# Patient Record
Sex: FEMALE
[De-identification: names, ages, dates, MRNs, and addresses within clinical notes are randomized per-mention and may not be internally consistent; named-entity substitution may affect disease eponyms.]

---

## 2022-04-27 PROBLEM — Z00.00 ENCOUNTER FOR PREVENTIVE HEALTH EXAMINATION: Status: ACTIVE | Noted: 2022-04-27

## 2022-04-29 ENCOUNTER — APPOINTMENT (OUTPATIENT)
Dept: ANTEPARTUM | Facility: CLINIC | Age: 37
End: 2022-04-29
Payer: COMMERCIAL

## 2022-04-29 PROCEDURE — 76811 OB US DETAILED SNGL FETUS: CPT

## 2022-05-04 ENCOUNTER — NON-APPOINTMENT (OUTPATIENT)
Age: 37
End: 2022-05-04

## 2022-05-11 ENCOUNTER — APPOINTMENT (OUTPATIENT)
Dept: MATERNAL FETAL MEDICINE | Facility: CLINIC | Age: 37
End: 2022-05-11

## 2022-05-12 ENCOUNTER — ASOB RESULT (OUTPATIENT)
Age: 37
End: 2022-05-12

## 2022-05-12 ENCOUNTER — APPOINTMENT (OUTPATIENT)
Dept: ANTEPARTUM | Facility: CLINIC | Age: 37
End: 2022-05-12
Payer: COMMERCIAL

## 2022-05-12 PROCEDURE — 76816 OB US FOLLOW-UP PER FETUS: CPT

## 2022-06-02 ENCOUNTER — OUTPATIENT (OUTPATIENT)
Dept: OUTPATIENT SERVICES | Age: 37
LOS: 1 days | Discharge: HOME | End: 2022-06-02

## 2022-06-06 ENCOUNTER — APPOINTMENT (OUTPATIENT)
Dept: PEDIATRIC CARDIOLOGY | Facility: CLINIC | Age: 37
End: 2022-06-06

## 2022-06-08 ENCOUNTER — ASOB RESULT (OUTPATIENT)
Age: 37
End: 2022-06-08

## 2022-06-08 ENCOUNTER — APPOINTMENT (OUTPATIENT)
Dept: MATERNAL FETAL MEDICINE | Facility: CLINIC | Age: 37
End: 2022-06-08
Payer: COMMERCIAL

## 2022-06-08 DIAGNOSIS — O24.414 GESTATIONAL DIABETES MELLITUS IN PREGNANCY, INSULIN CONTROLLED: ICD-10-CM

## 2022-06-08 PROCEDURE — G0108 DIAB MANAGE TRN  PER INDIV: CPT | Mod: 95

## 2022-06-08 RX ORDER — PEN NEEDLE, DIABETIC 29 G X1/2"
32G X 4 MM NEEDLE, DISPOSABLE MISCELLANEOUS
Qty: 2 | Refills: 3 | Status: ACTIVE | COMMUNITY
Start: 2022-06-08 | End: 1900-01-01

## 2022-06-08 RX ORDER — ISOPROPYL ALCOHOL 0.7 ML/ML
SWAB TOPICAL
Qty: 2 | Refills: 3 | Status: ACTIVE | COMMUNITY
Start: 2022-06-08 | End: 1900-01-01

## 2022-06-09 PROBLEM — Z00.00 ENCOUNTER FOR PREVENTIVE HEALTH EXAMINATION: Status: ACTIVE | Noted: 2022-06-09

## 2022-06-13 ENCOUNTER — APPOINTMENT (OUTPATIENT)
Dept: PEDIATRIC CARDIOLOGY | Facility: CLINIC | Age: 37
End: 2022-06-13

## 2022-06-13 PROCEDURE — 76820 UMBILICAL ARTERY ECHO: CPT

## 2022-06-13 PROCEDURE — 76827 ECHO EXAM OF FETAL HEART: CPT

## 2022-06-13 PROCEDURE — 93325 DOPPLER ECHO COLOR FLOW MAPG: CPT | Mod: 59

## 2022-06-13 PROCEDURE — 76821 MIDDLE CEREBRAL ARTERY ECHO: CPT

## 2022-06-13 PROCEDURE — 99202 OFFICE O/P NEW SF 15 MIN: CPT | Mod: 25

## 2022-06-13 PROCEDURE — 76825 ECHO EXAM OF FETAL HEART: CPT

## 2022-06-20 RX ORDER — INSULIN DETEMIR 100 [IU]/ML
100 INJECTION, SOLUTION SUBCUTANEOUS
Qty: 2 | Refills: 3 | Status: ACTIVE | COMMUNITY
Start: 2022-06-08 | End: 1900-01-01

## 2022-06-29 ENCOUNTER — APPOINTMENT (OUTPATIENT)
Dept: MATERNAL FETAL MEDICINE | Facility: CLINIC | Age: 37
End: 2022-06-29

## 2022-07-20 ENCOUNTER — NON-APPOINTMENT (OUTPATIENT)
Age: 37
End: 2022-07-20

## 2022-07-27 ENCOUNTER — NON-APPOINTMENT (OUTPATIENT)
Age: 37
End: 2022-07-27

## 2022-08-03 ENCOUNTER — ASOB RESULT (OUTPATIENT)
Age: 37
End: 2022-08-03

## 2022-08-03 ENCOUNTER — APPOINTMENT (OUTPATIENT)
Dept: MATERNAL FETAL MEDICINE | Facility: CLINIC | Age: 37
End: 2022-08-03

## 2022-08-03 PROCEDURE — G0108 DIAB MANAGE TRN  PER INDIV: CPT | Mod: 95

## 2022-08-10 ENCOUNTER — APPOINTMENT (OUTPATIENT)
Dept: MATERNAL FETAL MEDICINE | Facility: CLINIC | Age: 37
End: 2022-08-10

## 2022-08-10 ENCOUNTER — NON-APPOINTMENT (OUTPATIENT)
Age: 37
End: 2022-08-10

## 2022-08-26 ENCOUNTER — OUTPATIENT (OUTPATIENT)
Dept: OUTPATIENT SERVICES | Facility: HOSPITAL | Age: 37
LOS: 1 days | End: 2022-08-26
Payer: COMMERCIAL

## 2022-08-26 VITALS
HEART RATE: 90 BPM | DIASTOLIC BLOOD PRESSURE: 82 MMHG | WEIGHT: 255.07 LBS | RESPIRATION RATE: 16 BRPM | OXYGEN SATURATION: 99 % | SYSTOLIC BLOOD PRESSURE: 126 MMHG | TEMPERATURE: 98 F | HEIGHT: 61 IN

## 2022-08-26 DIAGNOSIS — O34.211 MATERNAL CARE FOR LOW TRANSVERSE SCAR FROM PREVIOUS CESAREAN DELIVERY: ICD-10-CM

## 2022-08-26 DIAGNOSIS — O24.419 GESTATIONAL DIABETES MELLITUS IN PREGNANCY, UNSPECIFIED CONTROL: ICD-10-CM

## 2022-08-26 DIAGNOSIS — Z01.818 ENCOUNTER FOR OTHER PREPROCEDURAL EXAMINATION: ICD-10-CM

## 2022-08-26 DIAGNOSIS — Z98.891 HISTORY OF UTERINE SCAR FROM PREVIOUS SURGERY: ICD-10-CM

## 2022-08-26 DIAGNOSIS — Z98.891 HISTORY OF UTERINE SCAR FROM PREVIOUS SURGERY: Chronic | ICD-10-CM

## 2022-08-26 LAB
ANION GAP SERPL CALC-SCNC: 12 MMOL/L — SIGNIFICANT CHANGE UP (ref 5–17)
BLD GP AB SCN SERPL QL: NEGATIVE — SIGNIFICANT CHANGE UP
BUN SERPL-MCNC: 11 MG/DL — SIGNIFICANT CHANGE UP (ref 7–23)
CALCIUM SERPL-MCNC: 8.7 MG/DL — SIGNIFICANT CHANGE UP (ref 8.4–10.5)
CHLORIDE SERPL-SCNC: 105 MMOL/L — SIGNIFICANT CHANGE UP (ref 96–108)
CO2 SERPL-SCNC: 20 MMOL/L — LOW (ref 22–31)
CREAT SERPL-MCNC: 0.56 MG/DL — SIGNIFICANT CHANGE UP (ref 0.5–1.3)
EGFR: 121 ML/MIN/1.73M2 — SIGNIFICANT CHANGE UP
GLUCOSE SERPL-MCNC: 125 MG/DL — HIGH (ref 70–99)
HCT VFR BLD CALC: 28.4 % — LOW (ref 34.5–45)
HGB BLD-MCNC: 8.8 G/DL — LOW (ref 11.5–15.5)
MCHC RBC-ENTMCNC: 25.4 PG — LOW (ref 27–34)
MCHC RBC-ENTMCNC: 31 GM/DL — LOW (ref 32–36)
MCV RBC AUTO: 81.8 FL — SIGNIFICANT CHANGE UP (ref 80–100)
NRBC # BLD: 0 /100 WBCS — SIGNIFICANT CHANGE UP (ref 0–0)
PLATELET # BLD AUTO: 183 K/UL — SIGNIFICANT CHANGE UP (ref 150–400)
POTASSIUM SERPL-MCNC: 4.3 MMOL/L — SIGNIFICANT CHANGE UP (ref 3.5–5.3)
POTASSIUM SERPL-SCNC: 4.3 MMOL/L — SIGNIFICANT CHANGE UP (ref 3.5–5.3)
RBC # BLD: 3.47 M/UL — LOW (ref 3.8–5.2)
RBC # FLD: 14.2 % — SIGNIFICANT CHANGE UP (ref 10.3–14.5)
RH IG SCN BLD-IMP: POSITIVE — SIGNIFICANT CHANGE UP
SODIUM SERPL-SCNC: 137 MMOL/L — SIGNIFICANT CHANGE UP (ref 135–145)
WBC # BLD: 9.09 K/UL — SIGNIFICANT CHANGE UP (ref 3.8–10.5)
WBC # FLD AUTO: 9.09 K/UL — SIGNIFICANT CHANGE UP (ref 3.8–10.5)

## 2022-08-26 PROCEDURE — 80048 BASIC METABOLIC PNL TOTAL CA: CPT

## 2022-08-26 PROCEDURE — 83036 HEMOGLOBIN GLYCOSYLATED A1C: CPT

## 2022-08-26 PROCEDURE — 36415 COLL VENOUS BLD VENIPUNCTURE: CPT

## 2022-08-26 PROCEDURE — 85027 COMPLETE CBC AUTOMATED: CPT

## 2022-08-26 PROCEDURE — 86900 BLOOD TYPING SEROLOGIC ABO: CPT

## 2022-08-26 PROCEDURE — 86850 RBC ANTIBODY SCREEN: CPT

## 2022-08-26 PROCEDURE — 86901 BLOOD TYPING SEROLOGIC RH(D): CPT

## 2022-08-26 PROCEDURE — G0463: CPT

## 2022-08-26 RX ORDER — CHLORHEXIDINE GLUCONATE 213 G/1000ML
1 SOLUTION TOPICAL ONCE
Refills: 0 | Status: COMPLETED | OUTPATIENT
Start: 2022-09-12 | End: 2022-09-12

## 2022-08-26 RX ORDER — CEFAZOLIN SODIUM 1 G
2000 VIAL (EA) INJECTION ONCE
Refills: 0 | Status: COMPLETED | OUTPATIENT
Start: 2022-09-12 | End: 2022-09-12

## 2022-08-26 RX ORDER — OXYTOCIN 10 UNIT/ML
333.33 VIAL (ML) INJECTION
Qty: 20 | Refills: 0 | Status: DISCONTINUED | OUTPATIENT
Start: 2022-09-12 | End: 2022-09-16

## 2022-08-26 RX ORDER — SODIUM CHLORIDE 9 MG/ML
1000 INJECTION, SOLUTION INTRAVENOUS ONCE
Refills: 0 | Status: COMPLETED | OUTPATIENT
Start: 2022-09-12 | End: 2022-09-12

## 2022-08-26 RX ORDER — SODIUM CHLORIDE 9 MG/ML
1000 INJECTION, SOLUTION INTRAVENOUS
Refills: 0 | Status: DISCONTINUED | OUTPATIENT
Start: 2022-09-12 | End: 2022-09-12

## 2022-08-26 RX ORDER — FAMOTIDINE 10 MG/ML
20 INJECTION INTRAVENOUS ONCE
Refills: 0 | Status: COMPLETED | OUTPATIENT
Start: 2022-09-12 | End: 2022-09-12

## 2022-08-26 RX ORDER — CITRIC ACID/SODIUM CITRATE 300-500 MG
15 SOLUTION, ORAL ORAL ONCE
Refills: 0 | Status: COMPLETED | OUTPATIENT
Start: 2022-09-12 | End: 2022-09-12

## 2022-08-26 NOTE — OB PST NOTE - PROBLEM SELECTOR PLAN 1
Repeat  section on 22  **Pending Preop covid testing .  ( pt had positive covid test on 22 with mild Flu like symptoms for 3-4 days).

## 2022-08-26 NOTE — OB PST NOTE - NSHPREVIEWOFSYSTEMS_GEN_ALL_CORE
REVIEW OF SYSTEMS:    CONSTITUTIONAL:   No weakness, fever or chills, Sugar 90-10's on Levemir.  EYES/ENT:        No visual changes;  No vertigo or throat pain   NECK:                No pain or stiffness  RESPIRATORY:   No cough, wheezing, hemoptysis; No shortness of breath  CARDIOVASCULAR:   No chest pain or palpitations  GASTROINTESTINAL:  No abdominal or epigastric pain. No nausea, vomiting, or hematemesis; No diarrhea or constipation. No melena or hematochezia.  GENITOURINARY:  Fetal movements +, denies painful contractions or abnormal vaginal discharge, no mucous/ blood stained vaginal discharge, no leaking of fluid or painful urination.  NEUROLOGICAL:  No head ache,  numbness or weakness  MUSCULOSKELETAL :No Pedal edema, no pain or limitation of activities, denies muscle cramps/no calf pain  SKIN:  No itching, rashes  Psych : Ds anxiety or depression. denies h/o hallucinations .

## 2022-08-26 NOTE — OB PST NOTE - NSICDXPASTMEDICALHX_GEN_ALL_CORE_FT
PAST MEDICAL HISTORY:  COVID-19 virus infection tested positive on 06/21 /2022 with mild Flu like symptoms for few days    Diabetes, gestational     Hypothyroidism      PAST MEDICAL HISTORY:  COVID-19 virus infection tested positive on 06/21 /2022 with mild Flu like symptoms for few days    Diabetes, gestational     DM (diabetes mellitus), gestational, antepartum CONTROLLED WITH iNSULI    Hypothyroidism     Morbid obesity

## 2022-08-26 NOTE — OB PST NOTE - ASSESSMENT
Repeat  on 2022.  **Pending Preop covid testing .  ( pt had positive covid test on 22 with mild Flu like symptoms for 3-4 days).

## 2022-08-26 NOTE — OB PST NOTE - HISTORY OF PRESENT ILLNESS
37 year old female  with h/o  in2017, now @ 37w3d of pregnancy with GDM on Levemir, scheduled for repeat  on 2022.  **Pending Preop covid testing .  ( pt had positive covid test on 22 with mild Flu like symptoms for 3-4 days). 37 year old morbidly obese female  with h/o  in2017, now @ 37w3d of pregnancy with GDM on Levemir, scheduled for repeat  on 2022.  **Pending Preop covid testing .  ( pt had positive covid test on 22 with mild Flu like symptoms for 3-4 days).

## 2022-08-26 NOTE — OB PST NOTE - TEMPERATURE IN CELSIUS (DEGREES C)
36.6
Identifies reasons for living/Has future plans/Engaged in work or school/Responsibility to family and others/Supportive social network of family or friends

## 2022-08-26 NOTE — OB PST NOTE - NS_OBGYNHISTORY_OBGYN_ALL_OB_FT
h/o  section in 2017  Gestational DM in both pregnancies, insulin controlled  Covid infection , tested positive on 2022

## 2022-08-26 NOTE — OB PST NOTE - NSANTHOSAYNRD_GEN_A_CORE
No. JUDI screening performed.  STOP BANG Legend: 0-2 = LOW Risk; 3-4 = INTERMEDIATE Risk; 5-8 = HIGH Risk

## 2022-08-26 NOTE — OB PST NOTE - FALL HARM RISK - UNIVERSAL INTERVENTIONS
Bed in lowest position, wheels locked, appropriate side rails in place/Call bell, personal items and telephone in reach/Instruct patient to call for assistance before getting out of bed or chair/Non-slip footwear when patient is out of bed/[place] to call system/Physically safe environment - no spills, clutter or unnecessary equipment/Purposeful Proactive Rounding/Room/bathroom lighting operational, light cord in reach

## 2022-08-27 LAB
A1C WITH ESTIMATED AVERAGE GLUCOSE RESULT: 8.1 % — HIGH (ref 4–5.6)
ESTIMATED AVERAGE GLUCOSE: 186 MG/DL — HIGH (ref 68–114)

## 2022-09-11 ENCOUNTER — TRANSCRIPTION ENCOUNTER (OUTPATIENT)
Age: 37
End: 2022-09-11

## 2022-09-12 ENCOUNTER — INPATIENT (INPATIENT)
Facility: HOSPITAL | Age: 37
LOS: 3 days | Discharge: LEFT BEFORE DISCHARGE | End: 2022-09-16
Attending: STUDENT IN AN ORGANIZED HEALTH CARE EDUCATION/TRAINING PROGRAM | Admitting: STUDENT IN AN ORGANIZED HEALTH CARE EDUCATION/TRAINING PROGRAM
Payer: COMMERCIAL

## 2022-09-12 VITALS
DIASTOLIC BLOOD PRESSURE: 80 MMHG | HEART RATE: 78 BPM | SYSTOLIC BLOOD PRESSURE: 134 MMHG | TEMPERATURE: 98 F | RESPIRATION RATE: 18 BRPM

## 2022-09-12 DIAGNOSIS — Z98.891 HISTORY OF UTERINE SCAR FROM PREVIOUS SURGERY: Chronic | ICD-10-CM

## 2022-09-12 DIAGNOSIS — O34.211 MATERNAL CARE FOR LOW TRANSVERSE SCAR FROM PREVIOUS CESAREAN DELIVERY: ICD-10-CM

## 2022-09-12 LAB
BASOPHILS # BLD AUTO: 0.05 K/UL — SIGNIFICANT CHANGE UP (ref 0–0.2)
BASOPHILS NFR BLD AUTO: 0.6 % — SIGNIFICANT CHANGE UP (ref 0–2)
COVID-19 SPIKE DOMAIN AB INTERP: POSITIVE
COVID-19 SPIKE DOMAIN ANTIBODY RESULT: 111 U/ML — HIGH
EOSINOPHIL # BLD AUTO: 0.04 K/UL — SIGNIFICANT CHANGE UP (ref 0–0.5)
EOSINOPHIL NFR BLD AUTO: 0.5 % — SIGNIFICANT CHANGE UP (ref 0–6)
GLUCOSE BLDC GLUCOMTR-MCNC: 75 MG/DL — SIGNIFICANT CHANGE UP (ref 70–99)
HCT VFR BLD CALC: 29.7 % — LOW (ref 34.5–45)
HGB BLD-MCNC: 9.1 G/DL — LOW (ref 11.5–15.5)
IMM GRANULOCYTES NFR BLD AUTO: 1.1 % — SIGNIFICANT CHANGE UP (ref 0–1.5)
LYMPHOCYTES # BLD AUTO: 2.34 K/UL — SIGNIFICANT CHANGE UP (ref 1–3.3)
LYMPHOCYTES # BLD AUTO: 28.6 % — SIGNIFICANT CHANGE UP (ref 13–44)
MCHC RBC-ENTMCNC: 24.1 PG — LOW (ref 27–34)
MCHC RBC-ENTMCNC: 30.6 GM/DL — LOW (ref 32–36)
MCV RBC AUTO: 78.8 FL — LOW (ref 80–100)
MONOCYTES # BLD AUTO: 0.61 K/UL — SIGNIFICANT CHANGE UP (ref 0–0.9)
MONOCYTES NFR BLD AUTO: 7.5 % — SIGNIFICANT CHANGE UP (ref 2–14)
NEUTROPHILS # BLD AUTO: 5.04 K/UL — SIGNIFICANT CHANGE UP (ref 1.8–7.4)
NEUTROPHILS NFR BLD AUTO: 61.7 % — SIGNIFICANT CHANGE UP (ref 43–77)
NRBC # BLD: 0 /100 WBCS — SIGNIFICANT CHANGE UP (ref 0–0)
PLATELET # BLD AUTO: 159 K/UL — SIGNIFICANT CHANGE UP (ref 150–400)
RBC # BLD: 3.77 M/UL — LOW (ref 3.8–5.2)
RBC # FLD: 15 % — HIGH (ref 10.3–14.5)
SARS-COV-2 IGG+IGM SERPL QL IA: 111 U/ML — HIGH
SARS-COV-2 IGG+IGM SERPL QL IA: POSITIVE
T PALLIDUM AB TITR SER: NEGATIVE — SIGNIFICANT CHANGE UP
WBC # BLD: 8.17 K/UL — SIGNIFICANT CHANGE UP (ref 3.8–10.5)
WBC # FLD AUTO: 8.17 K/UL — SIGNIFICANT CHANGE UP (ref 3.8–10.5)

## 2022-09-12 RX ORDER — OXYCODONE HYDROCHLORIDE 5 MG/1
5 TABLET ORAL
Refills: 0 | Status: COMPLETED | OUTPATIENT
Start: 2022-09-12 | End: 2022-09-19

## 2022-09-12 RX ORDER — DEXAMETHASONE 0.5 MG/5ML
4 ELIXIR ORAL EVERY 6 HOURS
Refills: 0 | Status: DISCONTINUED | OUTPATIENT
Start: 2022-09-12 | End: 2022-09-13

## 2022-09-12 RX ORDER — SIMETHICONE 80 MG/1
80 TABLET, CHEWABLE ORAL EVERY 4 HOURS
Refills: 0 | Status: DISCONTINUED | OUTPATIENT
Start: 2022-09-12 | End: 2022-09-16

## 2022-09-12 RX ORDER — IBUPROFEN 200 MG
600 TABLET ORAL EVERY 6 HOURS
Refills: 0 | Status: COMPLETED | OUTPATIENT
Start: 2022-09-12 | End: 2023-08-11

## 2022-09-12 RX ORDER — OXYCODONE HYDROCHLORIDE 5 MG/1
5 TABLET ORAL ONCE
Refills: 0 | Status: DISCONTINUED | OUTPATIENT
Start: 2022-09-12 | End: 2022-09-16

## 2022-09-12 RX ORDER — INFLUENZA VIRUS VACCINE 15; 15; 15; 15 UG/.5ML; UG/.5ML; UG/.5ML; UG/.5ML
0.5 SUSPENSION INTRAMUSCULAR ONCE
Refills: 0 | Status: DISCONTINUED | OUTPATIENT
Start: 2022-09-12 | End: 2022-09-16

## 2022-09-12 RX ORDER — OXYCODONE HYDROCHLORIDE 5 MG/1
10 TABLET ORAL
Refills: 0 | Status: DISCONTINUED | OUTPATIENT
Start: 2022-09-12 | End: 2022-09-13

## 2022-09-12 RX ORDER — MAGNESIUM HYDROXIDE 400 MG/1
30 TABLET, CHEWABLE ORAL
Refills: 0 | Status: DISCONTINUED | OUTPATIENT
Start: 2022-09-12 | End: 2022-09-16

## 2022-09-12 RX ORDER — MORPHINE SULFATE 50 MG/1
0.1 CAPSULE, EXTENDED RELEASE ORAL ONCE
Refills: 0 | Status: DISCONTINUED | OUTPATIENT
Start: 2022-09-12 | End: 2022-09-13

## 2022-09-12 RX ORDER — TETANUS TOXOID, REDUCED DIPHTHERIA TOXOID AND ACELLULAR PERTUSSIS VACCINE, ADSORBED 5; 2.5; 8; 8; 2.5 [IU]/.5ML; [IU]/.5ML; UG/.5ML; UG/.5ML; UG/.5ML
0.5 SUSPENSION INTRAMUSCULAR ONCE
Refills: 0 | Status: DISCONTINUED | OUTPATIENT
Start: 2022-09-12 | End: 2022-09-16

## 2022-09-12 RX ORDER — OXYTOCIN 10 UNIT/ML
333.33 VIAL (ML) INJECTION
Qty: 20 | Refills: 0 | Status: DISCONTINUED | OUTPATIENT
Start: 2022-09-12 | End: 2022-09-16

## 2022-09-12 RX ORDER — NALOXONE HYDROCHLORIDE 4 MG/.1ML
0.1 SPRAY NASAL
Refills: 0 | Status: DISCONTINUED | OUTPATIENT
Start: 2022-09-12 | End: 2022-09-13

## 2022-09-12 RX ORDER — HEPARIN SODIUM 5000 [USP'U]/ML
7500 INJECTION INTRAVENOUS; SUBCUTANEOUS EVERY 12 HOURS
Refills: 0 | Status: DISCONTINUED | OUTPATIENT
Start: 2022-09-12 | End: 2022-09-16

## 2022-09-12 RX ORDER — NALBUPHINE HYDROCHLORIDE 10 MG/ML
2.5 INJECTION, SOLUTION INTRAMUSCULAR; INTRAVENOUS; SUBCUTANEOUS EVERY 6 HOURS
Refills: 0 | Status: DISCONTINUED | OUTPATIENT
Start: 2022-09-12 | End: 2022-09-13

## 2022-09-12 RX ORDER — OXYCODONE HYDROCHLORIDE 5 MG/1
5 TABLET ORAL
Refills: 0 | Status: DISCONTINUED | OUTPATIENT
Start: 2022-09-12 | End: 2022-09-13

## 2022-09-12 RX ORDER — SODIUM CHLORIDE 9 MG/ML
1000 INJECTION, SOLUTION INTRAVENOUS
Refills: 0 | Status: DISCONTINUED | OUTPATIENT
Start: 2022-09-12 | End: 2022-09-16

## 2022-09-12 RX ORDER — HEPARIN SODIUM 5000 [USP'U]/ML
5000 INJECTION INTRAVENOUS; SUBCUTANEOUS EVERY 12 HOURS
Refills: 0 | Status: DISCONTINUED | OUTPATIENT
Start: 2022-09-12 | End: 2022-09-12

## 2022-09-12 RX ORDER — KETOROLAC TROMETHAMINE 30 MG/ML
30 SYRINGE (ML) INJECTION EVERY 6 HOURS
Refills: 0 | Status: DISCONTINUED | OUTPATIENT
Start: 2022-09-12 | End: 2022-09-13

## 2022-09-12 RX ORDER — ONDANSETRON 8 MG/1
4 TABLET, FILM COATED ORAL EVERY 6 HOURS
Refills: 0 | Status: DISCONTINUED | OUTPATIENT
Start: 2022-09-12 | End: 2022-09-13

## 2022-09-12 RX ORDER — DIPHENHYDRAMINE HCL 50 MG
25 CAPSULE ORAL EVERY 6 HOURS
Refills: 0 | Status: DISCONTINUED | OUTPATIENT
Start: 2022-09-12 | End: 2022-09-16

## 2022-09-12 RX ORDER — LEVOTHYROXINE SODIUM 125 MCG
125 TABLET ORAL DAILY
Refills: 0 | Status: DISCONTINUED | OUTPATIENT
Start: 2022-09-12 | End: 2022-09-16

## 2022-09-12 RX ORDER — ACETAMINOPHEN 500 MG
975 TABLET ORAL
Refills: 0 | Status: DISCONTINUED | OUTPATIENT
Start: 2022-09-12 | End: 2022-09-16

## 2022-09-12 RX ORDER — LANOLIN
1 OINTMENT (GRAM) TOPICAL EVERY 6 HOURS
Refills: 0 | Status: DISCONTINUED | OUTPATIENT
Start: 2022-09-12 | End: 2022-09-16

## 2022-09-12 RX ADMIN — Medication 100 MILLIGRAM(S): at 11:03

## 2022-09-12 RX ADMIN — HEPARIN SODIUM 7500 UNIT(S): 5000 INJECTION INTRAVENOUS; SUBCUTANEOUS at 18:40

## 2022-09-12 RX ADMIN — Medication 975 MILLIGRAM(S): at 21:08

## 2022-09-12 RX ADMIN — Medication 30 MILLIGRAM(S): at 12:25

## 2022-09-12 RX ADMIN — Medication 30 MILLIGRAM(S): at 18:47

## 2022-09-12 RX ADMIN — SODIUM CHLORIDE 125 MILLILITER(S): 9 INJECTION, SOLUTION INTRAVENOUS at 10:28

## 2022-09-12 RX ADMIN — Medication 975 MILLIGRAM(S): at 21:51

## 2022-09-12 RX ADMIN — FAMOTIDINE 20 MILLIGRAM(S): 10 INJECTION INTRAVENOUS at 10:28

## 2022-09-12 RX ADMIN — Medication 15 MILLILITER(S): at 10:27

## 2022-09-12 RX ADMIN — SODIUM CHLORIDE 2000 MILLILITER(S): 9 INJECTION, SOLUTION INTRAVENOUS at 10:28

## 2022-09-12 RX ADMIN — CHLORHEXIDINE GLUCONATE 1 APPLICATION(S): 213 SOLUTION TOPICAL at 10:28

## 2022-09-12 RX ADMIN — Medication 1000 MILLIUNIT(S)/MIN: at 13:13

## 2022-09-12 RX ADMIN — Medication 30 MILLIGRAM(S): at 23:44

## 2022-09-12 NOTE — OB PROVIDER H&P - HISTORY OF PRESENT ILLNESS
37y  presenting for scheduled rLTCS with PNC c/b GDMA2. Without complaints today. +FM, -VB, -LOF, -CTX     PNC: GDMA2 on 14u qAM/HS   GBS: Neg   EFW: 8lbs     ObHx: pLTCS (2017) 2/2 arrest at 8cm, 7lbs. Uncomplicated pregnancy.   GynHx: Denies   PMHx: Hypothyroidism   PSHx: pLTCS   Meds: PNV, Levothyroxine  Allergies: NKDA

## 2022-09-12 NOTE — OB RN INTRAOPERATIVE NOTE - NSSELHIDDEN_OBGYN_ALL_OB_FT
[NS_DeliveryAttending1_OBGYN_ALL_OB_FT:[email or web address]=],[NS_DeliveryAssist1_OBGYN_ALL_OB_FT:[id number]=],[NS_DeliveryRN_OBGYN_ALL_OB_FT:[id number]],[NS_CirculateRN2_OBGYN_ALL_OB_FT:[email or web address]=]

## 2022-09-12 NOTE — OB RN PATIENT PROFILE - NSICDXPASTMEDICALHX_GEN_ALL_CORE_FT
PAST MEDICAL HISTORY:  COVID-19 virus infection tested positive on 06/21 /2022 with mild Flu like symptoms for few days    Diabetes, gestational     DM (diabetes mellitus), gestational, antepartum CONTROLLED WITH iNSULI    Hypothyroidism     Morbid obesity

## 2022-09-12 NOTE — PRE-ANESTHESIA EVALUATION ADULT - NSANTHADDINFOFT_GEN_ALL_CORE
CSE, intrathecal duramorph explained to pt in detail;  risks include but not limited to HA, ,failure, nv injury.  All questions answered.

## 2022-09-12 NOTE — OB RN DELIVERY SUMMARY - NSSELHIDDEN_OBGYN_ALL_OB_FT
[NS_DeliveryAttending1_OBGYN_ALL_OB_FT:[email or web address]=],[NS_DeliveryAssist1_OBGYN_ALL_OB_FT:[id number]=],[NS_DeliveryRN_OBGYN_ALL_OB_FT:[id number]==],[NS_CirculateRN2_OBGYN_ALL_OB_FT:[email or web address]=]

## 2022-09-12 NOTE — DISCHARGE NOTE OB - PLAN OF CARE
please call if you have fever, heavy vaginal bleeding, pain uncontrolled with pain medicine, signs of wound infection

## 2022-09-12 NOTE — OB PROVIDER DELIVERY SUMMARY - NSPROVIDERDELIVERYNOTE_OBGYN_ALL_OB_FT
1 layer closure with caprosyn  normal tubes and ovaries  no adhesions  muscle and peritoneum closed  fascia closed with loop PDS x2  staples for skin    QBL 436cc  UO 200Cc    julio reyes DO 1 layer closure with caprosyn  normal tubes and ovaries  no adhesions  muscle and peritoneum closed  fascia closed with loop PDS x2  staples for skin    QBL 436cc  UO 200Cc    Dictation #: [id number]    julio reyes DO

## 2022-09-12 NOTE — DISCHARGE NOTE OB - PATIENT PORTAL LINK FT
You can access the FollowMyHealth Patient Portal offered by [facility] by registering at the following website: [email or web address]. By joining [facility]’s FollowMyHealth portal, you will also be able to view your health information using other applications (apps) compatible with our system.

## 2022-09-12 NOTE — DISCHARGE NOTE OB - NS MD DC FALL RISK RISK
For information on Fall & Injury Prevention, visit: [email or web address] OR  [email or web address] OR  https://www.cdc.gov/steadi/patient.html

## 2022-09-12 NOTE — DISCHARGE NOTE OB - CARE PLAN
1 Principal Discharge DX:	Single delivery by  section  Assessment and plan of treatment:	please call if you have fever, heavy vaginal bleeding, pain uncontrolled with pain medicine, signs of wound infection

## 2022-09-12 NOTE — OB PROVIDER H&P - ASSESSMENT
37y P1 presenting for scheduled rLTCS, without acute complaints.    - Admit to L&D for rLTCS    - NPO, Routine IVF    - T&S with confirmatory   - 2u PRBC on hold (BMI, h/o prior )    - Anesthesia consult    - NST     as per Dr Una Mullins PGY4

## 2022-09-12 NOTE — DISCHARGE NOTE OB - HOSPITAL COURSE
Patient admitted for scheduled RCS. delivery and postpartum course uncomplicated. Discharged on POD2

## 2022-09-12 NOTE — DISCHARGE NOTE OB - IF BREASTFEEDING, ADD A TOTAL OF 500 EXTRA CALORIES EACH DAY
Statement Selected Tranexamic Acid Counseling:  Patient advised of the small risk of bleeding problems with tranexamic acid. They were also instructed to call if they developed any nausea, vomiting or diarrhea. All of the patient's questions and concerns were addressed.

## 2022-09-12 NOTE — PRE-ANESTHESIA EVALUATION ADULT - NSANTHPMHFT_GEN_ALL_CORE
s/p primary C/S 2017 (failed induction), epidural w/o cx's ([place])  denies other PSH  Gest. DM, lantus 16 u BID, no am dose today.

## 2022-09-12 NOTE — OB PROVIDER H&P - NSHPPHYSICALEXAM_GEN_ALL_CORE
Gen: A&Ox3 NAD well appearing  CV: RRR  Pulm: Respirations even, unlabored  Abd: Soft, gravid, nontender  Ext: Full ROM

## 2022-09-12 NOTE — DISCHARGE NOTE OB - MATERIALS PROVIDED
Immunization Record/Breastfeeding Log/Bottle Feeding Log/Breastfeeding Mother’s Support Group Information/Guide to Postpartum Care/Shaken Baby Prevention Handout/Breastfeeding Guide and Packet/Birth Certificate Instructions

## 2022-09-12 NOTE — OB RN PATIENT PROFILE - FUNCTIONAL ASSESSMENT - BASIC MOBILITY 1.
Billing Type: Third-Party Bill
Bill For Surgical Tray: no
Expected Date Of Service: 03/08/2022
Lab Facility: [id number]
Performing Laboratory: [id number]
4 = No assist / stand by assistance

## 2022-09-13 LAB
ANISOCYTOSIS BLD QL: SLIGHT — SIGNIFICANT CHANGE UP
BASOPHILS # BLD AUTO: 0 K/UL — SIGNIFICANT CHANGE UP (ref 0–0.2)
BASOPHILS NFR BLD AUTO: 0 % — SIGNIFICANT CHANGE UP (ref 0–2)
DACRYOCYTES BLD QL SMEAR: SLIGHT — SIGNIFICANT CHANGE UP
EOSINOPHIL # BLD AUTO: 0 K/UL — SIGNIFICANT CHANGE UP (ref 0–0.5)
EOSINOPHIL NFR BLD AUTO: 0 % — SIGNIFICANT CHANGE UP (ref 0–6)
GIANT PLATELETS BLD QL SMEAR: PRESENT — SIGNIFICANT CHANGE UP
HCT VFR BLD CALC: 22.7 % — LOW (ref 34.5–45)
HCT VFR BLD CALC: 23.8 % — LOW (ref 34.5–45)
HGB BLD-MCNC: 6.9 G/DL — CRITICAL LOW (ref 11.5–15.5)
HGB BLD-MCNC: 7.2 G/DL — LOW (ref 11.5–15.5)
LYMPHOCYTES # BLD AUTO: 0.95 K/UL — LOW (ref 1–3.3)
LYMPHOCYTES # BLD AUTO: 6.1 % — LOW (ref 13–44)
MANUAL SMEAR VERIFICATION: SIGNIFICANT CHANGE UP
MCHC RBC-ENTMCNC: 23.8 PG — LOW (ref 27–34)
MCHC RBC-ENTMCNC: 24.1 PG — LOW (ref 27–34)
MCHC RBC-ENTMCNC: 30.3 GM/DL — LOW (ref 32–36)
MCHC RBC-ENTMCNC: 30.4 GM/DL — LOW (ref 32–36)
MCV RBC AUTO: 78.8 FL — LOW (ref 80–100)
MCV RBC AUTO: 79.4 FL — LOW (ref 80–100)
MONOCYTES # BLD AUTO: 0.69 K/UL — SIGNIFICANT CHANGE UP (ref 0–0.9)
MONOCYTES NFR BLD AUTO: 4.4 % — SIGNIFICANT CHANGE UP (ref 2–14)
NEUTROPHILS # BLD AUTO: 13.94 K/UL — HIGH (ref 1.8–7.4)
NEUTROPHILS NFR BLD AUTO: 89.5 % — HIGH (ref 43–77)
NRBC # BLD: 0 /100 WBCS — SIGNIFICANT CHANGE UP (ref 0–0)
PLAT MORPH BLD: ABNORMAL
PLATELET # BLD AUTO: 136 K/UL — LOW (ref 150–400)
PLATELET # BLD AUTO: 146 K/UL — LOW (ref 150–400)
POIKILOCYTOSIS BLD QL AUTO: SLIGHT — SIGNIFICANT CHANGE UP
POLYCHROMASIA BLD QL SMEAR: SIGNIFICANT CHANGE UP
RBC # BLD: 2.86 M/UL — LOW (ref 3.8–5.2)
RBC # BLD: 3.02 M/UL — LOW (ref 3.8–5.2)
RBC # FLD: 15.1 % — HIGH (ref 10.3–14.5)
RBC # FLD: 15.1 % — HIGH (ref 10.3–14.5)
RBC BLD AUTO: ABNORMAL
TARGETS BLD QL SMEAR: SLIGHT — SIGNIFICANT CHANGE UP
WBC # BLD: 13.45 K/UL — HIGH (ref 3.8–10.5)
WBC # BLD: 15.57 K/UL — HIGH (ref 3.8–10.5)
WBC # FLD AUTO: 13.45 K/UL — HIGH (ref 3.8–10.5)
WBC # FLD AUTO: 15.57 K/UL — HIGH (ref 3.8–10.5)

## 2022-09-13 RX ORDER — OXYCODONE HYDROCHLORIDE 5 MG/1
5 TABLET ORAL
Refills: 0 | Status: DISCONTINUED | OUTPATIENT
Start: 2022-09-13 | End: 2022-09-16

## 2022-09-13 RX ORDER — IBUPROFEN 200 MG
600 TABLET ORAL EVERY 6 HOURS
Refills: 0 | Status: DISCONTINUED | OUTPATIENT
Start: 2022-09-13 | End: 2022-09-16

## 2022-09-13 RX ORDER — FERROUS SULFATE 325(65) MG
325 TABLET ORAL DAILY
Refills: 0 | Status: DISCONTINUED | OUTPATIENT
Start: 2022-09-13 | End: 2022-09-16

## 2022-09-13 RX ORDER — ASCORBIC ACID 60 MG
500 TABLET,CHEWABLE ORAL DAILY
Refills: 0 | Status: DISCONTINUED | OUTPATIENT
Start: 2022-09-13 | End: 2022-09-16

## 2022-09-13 RX ADMIN — OXYCODONE HYDROCHLORIDE 5 MILLIGRAM(S): 5 TABLET ORAL at 21:10

## 2022-09-13 RX ADMIN — OXYCODONE HYDROCHLORIDE 5 MILLIGRAM(S): 5 TABLET ORAL at 22:09

## 2022-09-13 RX ADMIN — Medication 975 MILLIGRAM(S): at 09:08

## 2022-09-13 RX ADMIN — Medication 975 MILLIGRAM(S): at 03:20

## 2022-09-13 RX ADMIN — Medication 975 MILLIGRAM(S): at 04:00

## 2022-09-13 RX ADMIN — Medication 975 MILLIGRAM(S): at 14:30

## 2022-09-13 RX ADMIN — Medication 600 MILLIGRAM(S): at 11:01

## 2022-09-13 RX ADMIN — MAGNESIUM HYDROXIDE 30 MILLILITER(S): 400 TABLET, CHEWABLE ORAL at 21:10

## 2022-09-13 RX ADMIN — Medication 975 MILLIGRAM(S): at 21:10

## 2022-09-13 RX ADMIN — Medication 125 MICROGRAM(S): at 05:23

## 2022-09-13 RX ADMIN — Medication 500 MILLIGRAM(S): at 17:38

## 2022-09-13 RX ADMIN — Medication 30 MILLIGRAM(S): at 05:07

## 2022-09-13 RX ADMIN — Medication 600 MILLIGRAM(S): at 17:38

## 2022-09-13 RX ADMIN — Medication 975 MILLIGRAM(S): at 22:09

## 2022-09-13 RX ADMIN — Medication 325 MILLIGRAM(S): at 17:38

## 2022-09-13 RX ADMIN — Medication 30 MILLIGRAM(S): at 00:30

## 2022-09-13 RX ADMIN — HEPARIN SODIUM 7500 UNIT(S): 5000 INJECTION INTRAVENOUS; SUBCUTANEOUS at 17:39

## 2022-09-13 RX ADMIN — HEPARIN SODIUM 7500 UNIT(S): 5000 INJECTION INTRAVENOUS; SUBCUTANEOUS at 05:07

## 2022-09-13 RX ADMIN — Medication 600 MILLIGRAM(S): at 23:41

## 2022-09-13 NOTE — CHART NOTE - NSCHARTNOTEFT_GEN_A_CORE
Patient seen for: nutrition consult for GDM postpartum education prior to discharge    Source: patient, EMR    [facility] 36[facility]  Ad[facility] date: 9/12  Antepartum course significant for GDMA2  Pt plans on breastfeeding infant    Chart reviewed, events noted. Meds/labs reviewed.    Anthropometrics:  Height: 62 inches  Pre-pregnancy weight: 230 pounds   Weight gain: 29.9 pounds   Admit/Dosing wt: 259.9 pounds     Nutrition Diagnosis:   1) Food and Nutrition Related Knowledge Deficit related to knowledge of post-partum GDM nutrition recommendations as evidenced by patient with pregnancy complicated by GDM, now post-partum day 2.  2) Overweight/Obesity related to prolonged excessive energy intake exceeding expenditure as evidenced by BMI >40 kg/m2 based on pre-pregnancy weight.   Goal: Pt able to teach back 2 points of nutrition education provided.     Nutrition Intervention:  Post-partum GDM diet education provided:   1) Increased risk of developing T2DM with GDM and ways to help prevent development  2) 4-12 week fasting oral glucose tolerance test follow-up as outpatient  3) General healthful diet and physical activity recommendations discussed  4) Increased energy needs with breastfeeding    After-delivery GDM education handout provided. Pt made aware RD remains available.     Monitoring:  No other nutrition risks were identified during this encounter.  RD remains available upon request and will follow up per protocol.    Indira Mccarthy MS, RD N [facility] Pager #[phone] Patient seen for: nutrition consult for GDM postpartum education prior to discharge    Source: patient, EMR    Patient is a 38yo female s/p rLTCS  Admission date: 9/12  Antepartum course significant for GDMA2  Pt plans on breastfeeding infant    Chart reviewed, events noted. Meds/labs reviewed.    Anthropometrics:  Height: 62 inches  Pre-pregnancy weight: 230 pounds   Weight gain: 29.9 pounds   Admit/Dosing wt: 259.9 pounds     Nutrition Diagnosis:   1) Food and Nutrition Related Knowledge Deficit related to knowledge of post-partum GDM nutrition recommendations as evidenced by patient with pregnancy complicated by GDM, now post-partum.  2) Overweight/Obesity related to prolonged excessive energy intake exceeding expenditure as evidenced by BMI >40 kg/m2 based on pre-pregnancy weight.   Goal: Pt able to teach back 2 points of nutrition education provided.     Nutrition Intervention:  Post-partum GDM diet education provided:   1) Increased risk of developing T2DM with GDM and ways to help prevent development  2) 4-12 week fasting oral glucose tolerance test follow-up as outpatient  3) General healthful diet and physical activity recommendations discussed  4) Increased energy needs with breastfeeding    After-delivery GDM education handout provided. Pt made aware RD remains available.     Monitoring:  No other nutrition risks were identified during this encounter.  RD remains available upon request and will follow up per protocol.    Indira Mccarthy MS, RD, CDN [facility] Pager #[phone]

## 2022-09-13 NOTE — CHART NOTE - NSCHARTNOTEFT_GEN_A_CORE
36y/o  POD#2 from [facility] for prior complicated by vacuum-assisted delivery of the infant. QBL: 436. H/H prior to delivery 9.1/27.9.    LABS:               6.9    13.45 )-----------( 136      ( 13 Sep 2022 13:23 )             22.7    MD called due to pt hemoglobin of 6.9. MD assessed patient who reports she is asymptomatic with minimal vaginal bleeding. She is anemic at baseline. 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 At this time, the patient is refusing blood transfusion and would like to continue to monitor. Pt was counseled on letting team know if she starts to feel symptomatic with any weakness, fatigue, dizziness, or increased bleeding.      Marian Pace, PGY1  d/w Dr. Mukhereje

## 2022-09-14 LAB
ALBUMIN SERPL ELPH-MCNC: 2.7 G/DL — LOW (ref 3.3–5)
ALP SERPL-CCNC: 102 U/L — SIGNIFICANT CHANGE UP (ref 40–120)
ALT FLD-CCNC: 11 U/L — SIGNIFICANT CHANGE UP (ref 10–45)
ANION GAP SERPL CALC-SCNC: 9 MMOL/L — SIGNIFICANT CHANGE UP (ref 5–17)
APTT BLD: 24.3 SEC — LOW (ref 27.5–35.5)
AST SERPL-CCNC: 15 U/L — SIGNIFICANT CHANGE UP (ref 10–40)
BASOPHILS # BLD AUTO: 0.06 K/UL — SIGNIFICANT CHANGE UP (ref 0–0.2)
BASOPHILS NFR BLD AUTO: 0.5 % — SIGNIFICANT CHANGE UP (ref 0–2)
BILIRUB SERPL-MCNC: 0.2 MG/DL — SIGNIFICANT CHANGE UP (ref 0.2–1.2)
BUN SERPL-MCNC: 12 MG/DL — SIGNIFICANT CHANGE UP (ref 7–23)
CALCIUM SERPL-MCNC: 9.2 MG/DL — SIGNIFICANT CHANGE UP (ref 8.4–10.5)
CHLORIDE SERPL-SCNC: 107 MMOL/L — SIGNIFICANT CHANGE UP (ref 96–108)
CO2 SERPL-SCNC: 24 MMOL/L — SIGNIFICANT CHANGE UP (ref 22–31)
CREAT SERPL-MCNC: 0.71 MG/DL — SIGNIFICANT CHANGE UP (ref 0.5–1.3)
EGFR: 113 ML/MIN/1.73M2 — SIGNIFICANT CHANGE UP
EOSINOPHIL # BLD AUTO: 0.11 K/UL — SIGNIFICANT CHANGE UP (ref 0–0.5)
EOSINOPHIL NFR BLD AUTO: 1 % — SIGNIFICANT CHANGE UP (ref 0–6)
FIBRINOGEN PPP-MCNC: 687 MG/DL — HIGH (ref 330–520)
GLUCOSE SERPL-MCNC: 99 MG/DL — SIGNIFICANT CHANGE UP (ref 70–99)
HCT VFR BLD CALC: 23.3 % — LOW (ref 34.5–45)
HGB BLD-MCNC: 7 G/DL — CRITICAL LOW (ref 11.5–15.5)
IMM GRANULOCYTES NFR BLD AUTO: 1.6 % — HIGH (ref 0–1.5)
INR BLD: 1.01 RATIO — SIGNIFICANT CHANGE UP (ref 0.88–1.16)
LDH SERPL L TO P-CCNC: 239 U/L — SIGNIFICANT CHANGE UP (ref 50–242)
LYMPHOCYTES # BLD AUTO: 2.83 K/UL — SIGNIFICANT CHANGE UP (ref 1–3.3)
LYMPHOCYTES # BLD AUTO: 25.6 % — SIGNIFICANT CHANGE UP (ref 13–44)
MCHC RBC-ENTMCNC: 24.2 PG — LOW (ref 27–34)
MCHC RBC-ENTMCNC: 30 GM/DL — LOW (ref 32–36)
MCV RBC AUTO: 80.6 FL — SIGNIFICANT CHANGE UP (ref 80–100)
MONOCYTES # BLD AUTO: 0.9 K/UL — SIGNIFICANT CHANGE UP (ref 0–0.9)
MONOCYTES NFR BLD AUTO: 8.2 % — SIGNIFICANT CHANGE UP (ref 2–14)
NEUTROPHILS # BLD AUTO: 6.96 K/UL — SIGNIFICANT CHANGE UP (ref 1.8–7.4)
NEUTROPHILS NFR BLD AUTO: 63.1 % — SIGNIFICANT CHANGE UP (ref 43–77)
NRBC # BLD: 0 /100 WBCS — SIGNIFICANT CHANGE UP (ref 0–0)
PLATELET # BLD AUTO: 167 K/UL — SIGNIFICANT CHANGE UP (ref 150–400)
POTASSIUM SERPL-MCNC: 4.6 MMOL/L — SIGNIFICANT CHANGE UP (ref 3.5–5.3)
POTASSIUM SERPL-SCNC: 4.6 MMOL/L — SIGNIFICANT CHANGE UP (ref 3.5–5.3)
PROT SERPL-MCNC: 5.5 G/DL — LOW (ref 6–8.3)
PROTHROM AB SERPL-ACNC: 11.7 SEC — SIGNIFICANT CHANGE UP (ref 10.5–13.4)
RBC # BLD: 2.89 M/UL — LOW (ref 3.8–5.2)
RBC # FLD: 15.2 % — HIGH (ref 10.3–14.5)
SODIUM SERPL-SCNC: 140 MMOL/L — SIGNIFICANT CHANGE UP (ref 135–145)
URATE SERPL-MCNC: 7.4 MG/DL — HIGH (ref 2.5–7)
WBC # BLD: 11.04 K/UL — HIGH (ref 3.8–10.5)
WBC # FLD AUTO: 11.04 K/UL — HIGH (ref 3.8–10.5)

## 2022-09-14 RX ADMIN — OXYCODONE HYDROCHLORIDE 5 MILLIGRAM(S): 5 TABLET ORAL at 09:09

## 2022-09-14 RX ADMIN — Medication 975 MILLIGRAM(S): at 08:31

## 2022-09-14 RX ADMIN — Medication 975 MILLIGRAM(S): at 20:52

## 2022-09-14 RX ADMIN — Medication 600 MILLIGRAM(S): at 18:00

## 2022-09-14 RX ADMIN — Medication 975 MILLIGRAM(S): at 14:12

## 2022-09-14 RX ADMIN — HEPARIN SODIUM 7500 UNIT(S): 5000 INJECTION INTRAVENOUS; SUBCUTANEOUS at 05:41

## 2022-09-14 RX ADMIN — Medication 600 MILLIGRAM(S): at 13:05

## 2022-09-14 RX ADMIN — HEPARIN SODIUM 7500 UNIT(S): 5000 INJECTION INTRAVENOUS; SUBCUTANEOUS at 17:32

## 2022-09-14 RX ADMIN — Medication 975 MILLIGRAM(S): at 03:31

## 2022-09-14 RX ADMIN — Medication 975 MILLIGRAM(S): at 21:30

## 2022-09-14 RX ADMIN — Medication 975 MILLIGRAM(S): at 09:08

## 2022-09-14 RX ADMIN — OXYCODONE HYDROCHLORIDE 5 MILLIGRAM(S): 5 TABLET ORAL at 21:30

## 2022-09-14 RX ADMIN — OXYCODONE HYDROCHLORIDE 5 MILLIGRAM(S): 5 TABLET ORAL at 18:00

## 2022-09-14 RX ADMIN — OXYCODONE HYDROCHLORIDE 5 MILLIGRAM(S): 5 TABLET ORAL at 13:50

## 2022-09-14 RX ADMIN — OXYCODONE HYDROCHLORIDE 5 MILLIGRAM(S): 5 TABLET ORAL at 20:52

## 2022-09-14 RX ADMIN — OXYCODONE HYDROCHLORIDE 5 MILLIGRAM(S): 5 TABLET ORAL at 17:18

## 2022-09-14 RX ADMIN — Medication 600 MILLIGRAM(S): at 17:18

## 2022-09-14 RX ADMIN — Medication 600 MILLIGRAM(S): at 06:26

## 2022-09-14 RX ADMIN — Medication 600 MILLIGRAM(S): at 05:40

## 2022-09-14 RX ADMIN — Medication 325 MILLIGRAM(S): at 12:02

## 2022-09-14 RX ADMIN — OXYCODONE HYDROCHLORIDE 5 MILLIGRAM(S): 5 TABLET ORAL at 08:35

## 2022-09-14 RX ADMIN — OXYCODONE HYDROCHLORIDE 5 MILLIGRAM(S): 5 TABLET ORAL at 13:14

## 2022-09-14 RX ADMIN — Medication 975 MILLIGRAM(S): at 14:59

## 2022-09-14 RX ADMIN — Medication 500 MILLIGRAM(S): at 12:02

## 2022-09-14 RX ADMIN — Medication 600 MILLIGRAM(S): at 12:03

## 2022-09-14 RX ADMIN — Medication 125 MICROGRAM(S): at 05:41

## 2022-09-14 RX ADMIN — Medication 975 MILLIGRAM(S): at 02:59

## 2022-09-14 NOTE — PROVIDER CONTACT NOTE (OTHER) - ACTION/TREATMENT ORDERED:
No interventions ordered. Will continue to monitor. No interventions ordered. Will continue to monitor. Notify MD if /110.

## 2022-09-15 RX ADMIN — OXYCODONE HYDROCHLORIDE 5 MILLIGRAM(S): 5 TABLET ORAL at 14:29

## 2022-09-15 RX ADMIN — Medication 600 MILLIGRAM(S): at 11:40

## 2022-09-15 RX ADMIN — Medication 975 MILLIGRAM(S): at 02:34

## 2022-09-15 RX ADMIN — Medication 325 MILLIGRAM(S): at 11:40

## 2022-09-15 RX ADMIN — Medication 500 MILLIGRAM(S): at 11:41

## 2022-09-15 RX ADMIN — OXYCODONE HYDROCHLORIDE 5 MILLIGRAM(S): 5 TABLET ORAL at 13:47

## 2022-09-15 RX ADMIN — Medication 600 MILLIGRAM(S): at 06:24

## 2022-09-15 RX ADMIN — HEPARIN SODIUM 7500 UNIT(S): 5000 INJECTION INTRAVENOUS; SUBCUTANEOUS at 06:23

## 2022-09-15 RX ADMIN — Medication 600 MILLIGRAM(S): at 00:30

## 2022-09-15 RX ADMIN — OXYCODONE HYDROCHLORIDE 5 MILLIGRAM(S): 5 TABLET ORAL at 20:50

## 2022-09-15 RX ADMIN — Medication 600 MILLIGRAM(S): at 17:48

## 2022-09-15 RX ADMIN — Medication 600 MILLIGRAM(S): at 00:02

## 2022-09-15 RX ADMIN — Medication 975 MILLIGRAM(S): at 03:10

## 2022-09-15 RX ADMIN — Medication 125 MICROGRAM(S): at 06:23

## 2022-09-15 RX ADMIN — Medication 600 MILLIGRAM(S): at 17:07

## 2022-09-15 RX ADMIN — Medication 600 MILLIGRAM(S): at 12:40

## 2022-09-15 RX ADMIN — OXYCODONE HYDROCHLORIDE 5 MILLIGRAM(S): 5 TABLET ORAL at 06:24

## 2022-09-16 VITALS
TEMPERATURE: 98 F | HEART RATE: 100 BPM | RESPIRATION RATE: 18 BRPM | SYSTOLIC BLOOD PRESSURE: 138 MMHG | DIASTOLIC BLOOD PRESSURE: 79 MMHG | OXYGEN SATURATION: 100 %

## 2022-09-16 PROCEDURE — 86780 TREPONEMA PALLIDUM: CPT

## 2022-09-16 PROCEDURE — 85025 COMPLETE CBC W/AUTO DIFF WBC: CPT

## 2022-09-16 PROCEDURE — 59050 FETAL MONITOR W/REPORT: CPT

## 2022-09-16 PROCEDURE — 83615 LACTATE (LD) (LDH) ENZYME: CPT

## 2022-09-16 PROCEDURE — 85610 PROTHROMBIN TIME: CPT

## 2022-09-16 PROCEDURE — 86850 RBC ANTIBODY SCREEN: CPT

## 2022-09-16 PROCEDURE — 86901 BLOOD TYPING SEROLOGIC RH(D): CPT

## 2022-09-16 PROCEDURE — 59025 FETAL NON-STRESS TEST: CPT

## 2022-09-16 PROCEDURE — 36415 COLL VENOUS BLD VENIPUNCTURE: CPT

## 2022-09-16 PROCEDURE — 86769 SARS-COV-2 COVID-19 ANTIBODY: CPT

## 2022-09-16 PROCEDURE — 85384 FIBRINOGEN ACTIVITY: CPT

## 2022-09-16 PROCEDURE — 84550 ASSAY OF BLOOD/URIC ACID: CPT

## 2022-09-16 PROCEDURE — 82962 GLUCOSE BLOOD TEST: CPT

## 2022-09-16 PROCEDURE — 85027 COMPLETE CBC AUTOMATED: CPT

## 2022-09-16 PROCEDURE — 86900 BLOOD TYPING SEROLOGIC ABO: CPT

## 2022-09-16 PROCEDURE — 80053 COMPREHEN METABOLIC PANEL: CPT

## 2022-09-16 PROCEDURE — 85730 THROMBOPLASTIN TIME PARTIAL: CPT

## 2022-09-16 RX ADMIN — Medication 975 MILLIGRAM(S): at 02:52

## 2022-09-16 RX ADMIN — Medication 975 MILLIGRAM(S): at 14:30

## 2022-09-16 RX ADMIN — Medication 975 MILLIGRAM(S): at 08:28

## 2022-09-16 RX ADMIN — Medication 125 MICROGRAM(S): at 05:49

## 2022-09-16 RX ADMIN — Medication 975 MILLIGRAM(S): at 08:55

## 2022-09-16 RX ADMIN — Medication 600 MILLIGRAM(S): at 11:17

## 2022-09-16 RX ADMIN — Medication 325 MILLIGRAM(S): at 11:17

## 2022-09-16 RX ADMIN — Medication 600 MILLIGRAM(S): at 11:47

## 2022-09-16 RX ADMIN — Medication 600 MILLIGRAM(S): at 00:45

## 2022-09-16 RX ADMIN — Medication 600 MILLIGRAM(S): at 01:45

## 2022-09-16 RX ADMIN — Medication 600 MILLIGRAM(S): at 05:49

## 2022-09-16 RX ADMIN — Medication 975 MILLIGRAM(S): at 15:00

## 2022-09-16 RX ADMIN — Medication 500 MILLIGRAM(S): at 11:17

## 2022-09-16 NOTE — PROGRESS NOTE ADULT - NUTRITIONAL ASSESSMENT
This patient has been assessed with a concern for Malnutrition and has been determined to have a diagnosis/diagnoses of Morbid obesity (BMI > 40).    This patient is being managed with:   Diet Regular-  Entered: Sep 12 2022 12:39PM    

## 2022-09-16 NOTE — CHART NOTE - NSCHARTNOTEFT_GEN_A_CORE
Earlier today pt with elevated BP. At the time the pt was stressed and upset ab the baby in the nicu. Was told by Dr Nicole that she should stay for BP monitoring to make sure no need for meds/magnesium or any preeclampsia. Repeat BP was 144/83. This plan was communicated to the pt by Dr Nicole. The plan was for repeat at 1PM and to reassess at that point what the next steps would be. Repeat /79. I reviewed this with the residents and reviewed that I recommend repeat HELLP labs and repeat BP 1-2 more times. If all normal would be OK with outside BP monitoring.     The residents went to discuss this with her at which pt the pt stated she refused BW and to stay. She would like to leave AMA. She also spoke with the  Dr Zaldivar. I then called the pt over the phone and reviewed the risks of preeclampsia. I reviewed the risks to her including seizure stroke and death. She verbalized understanding and still refused BW/BP monitoring. I reviewed that if she insists on leaving I recommend at the bare minimum that she monitor her BPs at home and let us know how they are. She has a cuff at home and will take her BP when she gets home. I reviewed how to call the service and she will call the service with her BP at home and BP at night. She will then take her BP 2x daily at minimum. Reviewed if >160/105 or sx of pec would rec eval in ED for preeclampsia as this is high and she would need seizure ppx. I did however request tht she call us regardless with her BPs. She verb understanding and is agreeable with this plan.     Marleny Wagoner,

## 2022-09-16 NOTE — CHART NOTE - NSCHARTNOTEFT_GEN_A_CORE
R1 Event Note    Pt is a 37y  P[facility]4 [facility] Pt found to have a severe range BP this morning, otherwise BPs 130-140s/70-80s. Discussed with Dr. Wagoner, plan was to repeat at least 2 BPs and order STAT HELLP labs (last set of HELLP labs ).    Pt seen at bedside to discuss plan. Pt reports she is extremely stressed due to baby in NICU and wants to go home now. Declines blood work and repeat BP measurements. Pt reports she would like to sign AMA paperwork and go home. Pt declines headaches, vision changes, epigastric pain, SOB. Reviewed the concern for development of preeclampsia given elevated BPs and severe range BP this AM. Reviewed risks of untreated preeclampsia, including seizures, strokes, and death. Reviewed with pt that insurance may not cover hospital stay or future readmissions if she signs out AMA. Pt expressed understanding and questions answered to apparent pt satisfaction. Pt reports she has a follow-up appointment next Tuesday. Pt encouraged to monitor BPs at home and to return to the ED for any BPs >160/110, as well as preeclampsia sx including headaches, vision changes, epigastric pain, SOB.     D/w Dr. Wagoner, private attending and Dr. Zaldivar,   [place] PGY1

## 2022-09-16 NOTE — PROGRESS NOTE ADULT - ASSESSMENT
36y/[facility]  [facility]. QBL: 436. H/H 9.1/27.9. Patient recovering well postoperatively.    #Postpartum state  - Continue with po analgesia  - Increase ambulation  - Continue regular diet  - Check CBC  - Incision dressing removed  - DVT prophylaxis with 5000u Heparin    Marian Pace  PGY-1
36[facility]  [facility] delivery of [facility] infant. QBL: 436. Postpartum course c/b gHTN and anemia. Patient recovering well postoperatively.    #Anemia  - Hct 29.7->23.8->22.7  -   - Pt refused 1u pRBC yesterday  - Pt denies dizziness, weakness this AM  - Continue Fe/Vit C    #gHTN  - Pt met criteria overnight, overnight BPs 120-140s/70s  - Pt denies headaches, vision changes, epigastric pain, SOB  - F/u AM HELLP labs  - Continue to monitor BPs    #Postpartum state  - Continue with po analgesia  - Increase ambulation  - Continue regular diet  - Check CBC  - Incision dressing removed  - DVT prophylaxis with 5000u Heparin    [place] PGY1
36[facility]  [facility]. QBL: 436. Postpartum course c/b gHTN and anemia. Patient recovering well postoperatively.    #Anemia  - Hct 29.7->23.8->22.7->23.3  -   - Pt refusing blood transfusion  - Pt denies dizziness, weakness, palpitations this AM  - Continue Fe/Vit C    #gHTN  - Pt met criteria by BP systolic 140s multiple times  - HELLP labs wnl  - Overnight BPs 130-140/70-80s  - Pt asymptomatic  - Continue to monitor BPs    #Postpartum state  - Continue with po analgesia  - Increase ambulation  - Continue regular diet  - DVT prophylaxis with 5000u Heparin    Marian Pace, PGY1
A/P: 36yo POD#4 s/p LTCS.  Patient is stable and doing well post-operatively.     #PP  - Pain well controlled, continue Motrin, Tylenol, and Oxycodone PRN  - Increase ambulation, SCDs when not ambulating  - Continue regular diet    Bibiana [place] PGY1

## 2022-09-16 NOTE — CHART NOTE - NSCHARTNOTEFT_GEN_A_CORE
Safety office note  Called by team due to pt demanding to leave AMA.  Pt is POD 4 S/P C/S.  She has been diagnosed w gHTN.  Her blood pressures were elevated today.  Plan was made to observe BPs today and determine need for further eval and treatment.  Explained risks of HTN to pt and partner including but not limited to risks of preeclampsia, stroke, ER readmission and death.  Pt states that she understands risks and wishes to leave AMA.  She is scheduled for appt in office on 9/20.  Risks of HTN in pp period again reviewed.  Pt planning to leave AMA.  DOROTHY Zaldivar

## 2022-09-16 NOTE — PROGRESS NOTE ADULT - SUBJECTIVE AND OBJECTIVE BOX
OB Progress Note:  Delivery, POD#4    S: 38yo POD#4 s/p LTCS. Patient feels well. Pain is well controlled. She is tolerating a regular diet and passing flatus. She is voiding spontaneously and ambulating without difficulty. Endorses light vaginal bleeding, soaking < 1 pad/hour. Denies CP/SOB. Denies lightheadedness/dizziness. Denies N/V.    MEDICATIONS  (STANDING):  acetaminophen     Tablet .. 975 milliGRAM(s) Oral <User Schedule>  ascorbic acid 500 milliGRAM(s) Oral daily  diphtheria/tetanus/pertussis (acellular) Vaccine (ADAcel) 0.5 milliLiter(s) IntraMuscular once  ferrous    sulfate 325 milliGRAM(s) Oral daily  heparin   Injectable 7500 Unit(s) SubCutaneous every 12 hours  ibuprofen  Tablet. 600 milliGRAM(s) Oral every 6 hours  influenza   Vaccine 0.5 milliLiter(s) IntraMuscular once  lactated ringers. 1000 milliLiter(s) (125 mL/Hr) IV Continuous <Continuous>  levothyroxine 125 MICROGram(s) Oral daily  oxytocin Infusion 333.333 milliUNIT(s)/Min (1000 mL/Hr) IV Continuous <Continuous>  oxytocin Infusion 333.333 milliUNIT(s)/Min (1000 mL/Hr) IV Continuous <Continuous>      MEDICATIONS  (PRN):  diphenhydrAMINE 25 milliGRAM(s) Oral every 6 hours PRN Pruritus  lanolin Ointment 1 Application(s) Topical every 6 hours PRN Sore Nipples  magnesium hydroxide Suspension 30 milliLiter(s) Oral two times a day PRN Constipation  oxyCODONE    IR 5 milliGRAM(s) Oral once PRN Moderate to Severe Pain (4-10)  oxyCODONE    IR 5 milliGRAM(s) Oral every 3 hours PRN Moderate to Severe Pain (4-10)  simethicone 80 milliGRAM(s) Chew every 4 hours PRN Gas      O:  Vitals:  Vital Signs Last 24 Hrs  T(C): 36.8 (16 Sep 2022 05:59), Max: 37 (15 Sep 2022 08:45)  T(F): 98.2 (16 Sep 2022 05:59), Max: 98.6 (15 Sep 2022 08:45)  HR: 85 (16 Sep 2022 05:59) (85 - 92)  BP: 137/87 (16 Sep 2022 05:59) (128/85 - 149/81)  BP(mean): --  RR: 17 (16 Sep 2022 05:59) (17 - 18)  SpO2: 100% (16 Sep 2022 05:59) (98% - 100%)    Parameters below as of 16 Sep 2022 05:59  Patient On (Oxygen Delivery Method): room air        Labs:  Blood type: B Positive  Rubella IgG: RPR: Negative                          7.0<LL>   11.04<H> >-----------< 167    (  @ 06:33 )             23.3<L>                        6.9<LL>   13.45<H> >-----------< 136<L>    (  @ 13:23 )             22.7<L>                        7.2<L>   15.57<H> >-----------< 146<L>    (  @ 07:09 )             23.8<L>    22 @ 06:34      140  |  107  |  12  ----------------------------<  99  4.6   |  24  |  0.71        Ca    9.2      14 Sep 2022 06:34    TPro  5.5<L>  /  Alb  2.7<L>  /  TBili  0.2  /  DBili  x   /  AST  15  /  ALT  11  /  AlkPhos  102  22 @ 06:34          Physical Exam:  General: NAD  Heart: Clinically well-perfused  Lungs: Breathing comfortably on room air  Abdomen: Soft, non-distended, appropriately tender, fundus firm, incision c/d/i  Vaginal: Lochia wnl  Extremities: No calf erythema/edema/tenderness  
R1 Progress Note    Patient seen and examined at bedside, no acute overnight events. No acute complaints, pain well controlled. Patient is ambulating and tolerating regular diet. Voiding spontaneously and passing flatus. Denies CP, SOB, N/V, HA, blurred vision, epigastric pain.    Vital Signs Last 24 Hours  T(C): 36.6 (09-14-22 @ 05:52), Max: 36.7 (09-13-22 @ 09:50)  HR: 81 (09-14-22 @ 05:52) (71 - 83)  BP: 142/82 (09-14-22 @ 05:52) (115/63 - 142/82)  RR: 18 (09-14-22 @ 05:52) (18 - 18)  SpO2: 96% (09-14-22 @ 05:52) (96% - 100%)    I&O's Summary      Physical Exam:  General: NAD  Abdomen: Soft, non-tender, non-distended, fundus firm  Incision: Pfannenstiel incision CDI, subcuticular suture closure  Pelvic: Lochia wnl    Labs:    Blood Type: B Positive  Antibody Screen: Negative  RPR: Negative               6.9    13.45 )-----------( 136      ( 09-13 @ 13:23 )             22.7                7.2    15.57 )-----------( 146      ( 09-13 @ 07:09 )             23.8                9.1    8.17  )-----------( 159      ( 09-12 @ 09:20 )             29.7         MEDICATIONS  (STANDING):  acetaminophen     Tablet .. 975 milliGRAM(s) Oral <User Schedule>  ascorbic acid 500 milliGRAM(s) Oral daily  diphtheria/tetanus/pertussis (acellular) Vaccine (ADAcel) 0.5 milliLiter(s) IntraMuscular once  ferrous    sulfate 325 milliGRAM(s) Oral daily  heparin   Injectable 7500 Unit(s) SubCutaneous every 12 hours  ibuprofen  Tablet. 600 milliGRAM(s) Oral every 6 hours  influenza   Vaccine 0.5 milliLiter(s) IntraMuscular once  lactated ringers. 1000 milliLiter(s) (125 mL/Hr) IV Continuous <Continuous>  levothyroxine 125 MICROGram(s) Oral daily  oxytocin Infusion 333.333 milliUNIT(s)/Min (1000 mL/Hr) IV Continuous <Continuous>  oxytocin Infusion 333.333 milliUNIT(s)/Min (1000 mL/Hr) IV Continuous <Continuous>    MEDICATIONS  (PRN):  diphenhydrAMINE 25 milliGRAM(s) Oral every 6 hours PRN Pruritus  lanolin Ointment 1 Application(s) Topical every 6 hours PRN Sore Nipples  magnesium hydroxide Suspension 30 milliLiter(s) Oral two times a day PRN Constipation  oxyCODONE    IR 5 milliGRAM(s) Oral once PRN Moderate to Severe Pain (4-10)  oxyCODONE    IR 5 milliGRAM(s) Oral every 3 hours PRN Moderate to Severe Pain (4-10)  simethicone 80 milliGRAM(s) Chew every 4 hours PRN Gas  
Day 1 of Anesthesia Pain Management Service    SUBJECTIVE:  Pain Scale Score:          [X] Refer to charted pain scores    THERAPY: Received 100 mcg PF spinal morphine as above    OBJECTIVE:    Sedation:        	[X] Alert	[ ] Drowsy	[ ] Arousable      [ ] Asleep       [ ] Unresponsive    Side Effects:	[X] None	[ ] Nausea	[ ] Vomiting         [ ] Pruritus  		[ ] Weakness            [ ] Numbness	          [ ] Other:    ASSESSMENT/ PLAN  [X] Patient transitioned to prn analgesics  [X] Pain management per primary service, pain service to sign off   [X]Documentation and Verification of current medications
Day 1 of Anesthesia Pain Management Service    SUBJECTIVE: Doing ok  Pain Scale Score:          [X] Refer to charted pain scores    THERAPY:    s/p    100 mcg PF morphine on 9\12\2022      MEDICATIONS  (STANDING):  acetaminophen     Tablet .. 975 milliGRAM(s) Oral <User Schedule>  diphtheria/tetanus/pertussis (acellular) Vaccine (ADAcel) 0.5 milliLiter(s) IntraMuscular once  heparin   Injectable 7500 Unit(s) SubCutaneous every 12 hours  ibuprofen  Tablet. 600 milliGRAM(s) Oral every 6 hours  influenza   Vaccine 0.5 milliLiter(s) IntraMuscular once  lactated ringers. 1000 milliLiter(s) (125 mL/Hr) IV Continuous <Continuous>  levothyroxine 125 MICROGram(s) Oral daily  morphine PF Spinal 0.1 milliGRAM(s) IntraThecal. once  oxytocin Infusion 333.333 milliUNIT(s)/Min (1000 mL/Hr) IV Continuous <Continuous>  oxytocin Infusion 333.333 milliUNIT(s)/Min (1000 mL/Hr) IV Continuous <Continuous>    MEDICATIONS  (PRN):  dexAMETHasone  Injectable 4 milliGRAM(s) IV Push every 6 hours PRN Nausea  diphenhydrAMINE 25 milliGRAM(s) Oral every 6 hours PRN Pruritus  lanolin Ointment 1 Application(s) Topical every 6 hours PRN Sore Nipples  magnesium hydroxide Suspension 30 milliLiter(s) Oral two times a day PRN Constipation  nalbuphine Injectable 2.5 milliGRAM(s) IV Push every 6 hours PRN Pruritus  naloxone Injectable 0.1 milliGRAM(s) IV Push every 3 minutes PRN For ANY of the following changes in patient status:  A. Breaths Per Minute LESS THAN 10, B. Oxygen saturation LESS THAN 90%, C. Sedation score of 6 for Stop After: 4 Times  ondansetron Injectable 4 milliGRAM(s) IV Push every 6 hours PRN Nausea  oxyCODONE    IR 5 milliGRAM(s) Oral every 3 hours PRN Mild Pain (1 - 3)  oxyCODONE    IR 10 milliGRAM(s) Oral every 3 hours PRN Moderate Pain (4 - 6)  oxyCODONE    IR 5 milliGRAM(s) Oral every 3 hours PRN Moderate to Severe Pain (4-10)  oxyCODONE    IR 5 milliGRAM(s) Oral once PRN Moderate to Severe Pain (4-10)  simethicone 80 milliGRAM(s) Chew every 4 hours PRN Gas      OBJECTIVE:    Sedation:        	[X] Alert	 [ ] Drowsy	[ ] Arousable      [ ] Asleep       [ ] Unresponsive    Side Effects:	[X] None 	[ ] Nausea	[ ] Vomiting         [ ] Pruritus  		[ ] Weakness            [ ] Numbness	          [ ] Other:    Vital Signs Last 24 Hrs  T(C): 36.7 (13 Sep 2022 05:20), Max: 37 (12 Sep 2022 15:45)  T(F): 98.1 (13 Sep 2022 05:20), Max: 98.6 (12 Sep 2022 15:45)  HR: 70 (13 Sep 2022 05:20) (60 - 82)  BP: 126/75 (13 Sep 2022 05:20) (119/58 - 167/58)  BP(mean): 86 (12 Sep 2022 16:00) (81 - 101)  RR: 18 (13 Sep 2022 05:20) (18 - 31)  SpO2: 98% (13 Sep 2022 05:20) (97% - 100%)    Parameters below as of 13 Sep 2022 05:20  Patient On (Oxygen Delivery Method): room air        ASSESSMENT/ PLAN  [X] Patient to be transitioned to prn analgesics after 24 hours  [X] Pain management per primary service, pain service to sign off   [X]Documentation and Verification of current medications
Postpartum Note,  Section    ATTENDING NOTE Post-operative day 3    Subjective:  The patient feels well.  She is ambulating.   She is tolerating regular diet.  She denies nausea and vomiting.  She is voiding.  Her pain is controlled.  She reports normal postpartum bleeding    Physical exam:    Vital Signs Last 24 Hrs  T(C): 36.9 (15 Sep 2022 04:40), Max: 37.2 (14 Sep 2022 09:10)  T(F): 98.5 (15 Sep 2022 04:40), Max: 98.9 (14 Sep 2022 09:10)  HR: 95 (15 Sep 2022 04:40) (78 - 95)  BP: 130/70 (15 Sep 2022 04:40) (124/83 - 152/90)  BP(mean): --  RR: 18 (15 Sep 2022 04:40) (17 - 18)  SpO2: 99% (15 Sep 2022 04:40) (97% - 100%)    Parameters below as of 15 Sep 2022 04:40  Patient On (Oxygen Delivery Method): room air        Gen: NAD  Breast: Soft, nontender, not engorged.  Abdomen: Soft, nontender, no distension , firm uterine fundus at umbilicus.  Incision: Clean, dry, and intact  Pelvic: Normal lochia noted  Ext: No calf tenderness    LABS:                        7.0    11.04 )-----------( 167      ( 14 Sep 2022 06:33 )             23.3                         6.9    13.45 )-----------( 136      ( 13 Sep 2022 13:23 )             22.7     ABO Interpretation: B (22 @ 09:23)  Rh Interpretation: Positive (22 @ 09:23)    Antibody ScreenNegative    22 @ 06:34      140  |  107  |  12  ----------------------------<  99  4.6   |  24  |  0.71        Ca    9.2      14 Sep 2022 06:34    TPro  5.5<L>  /  Alb  2.7<L>  /  TBili  0.2  /  DBili  x   /  AST  15  /  ALT  11  /  AlkPhos  102  22 @ 06:34        Allergies    No Known Allergies    Intolerances      MEDICATIONS  (STANDING):  acetaminophen     Tablet .. 975 milliGRAM(s) Oral <User Schedule>  ascorbic acid 500 milliGRAM(s) Oral daily  diphtheria/tetanus/pertussis (acellular) Vaccine (ADAcel) 0.5 milliLiter(s) IntraMuscular once  ferrous    sulfate 325 milliGRAM(s) Oral daily  heparin   Injectable 7500 Unit(s) SubCutaneous every 12 hours  ibuprofen  Tablet. 600 milliGRAM(s) Oral every 6 hours  influenza   Vaccine 0.5 milliLiter(s) IntraMuscular once  lactated ringers. 1000 milliLiter(s) (125 mL/Hr) IV Continuous <Continuous>  levothyroxine 125 MICROGram(s) Oral daily  oxytocin Infusion 333.333 milliUNIT(s)/Min (1000 mL/Hr) IV Continuous <Continuous>  oxytocin Infusion 333.333 milliUNIT(s)/Min (1000 mL/Hr) IV Continuous <Continuous>    MEDICATIONS  (PRN):  diphenhydrAMINE 25 milliGRAM(s) Oral every 6 hours PRN Pruritus  lanolin Ointment 1 Application(s) Topical every 6 hours PRN Sore Nipples  magnesium hydroxide Suspension 30 milliLiter(s) Oral two times a day PRN Constipation  oxyCODONE    IR 5 milliGRAM(s) Oral once PRN Moderate to Severe Pain (4-10)  oxyCODONE    IR 5 milliGRAM(s) Oral every 3 hours PRN Moderate to Severe Pain (4-10)  simethicone 80 milliGRAM(s) Chew every 4 hours PRN Gas        Assessment and Plan  POD # 3  s/p  section. Stable.  low h/h.  will be on iron  Encourage ambulation  Analgesia prn  Regular diet   F/U in office in 1 week for incision check    baby on cpap in the nicu.  patient will be staying until tomorrow.           Office [phone]  Dr. Mendes          
R1 Progress Note    Patient seen and examined at bedside, no acute overnight events. No acute complaints, pain well controlled. Patient is ambulating and tolerating regular diet. Has passed flatus but not yet had a bowel movement. Urinating independently. Denies CP, SOB, N/V, HA, blurred vision, dizziness, palpitations.    Vital Signs Last 24 Hours  T(C): 37 (09-15-22 @ 08:45), Max: 37 (09-14-22 @ 17:25)  HR: 92 (09-15-22 @ 08:45) (78 - 95)  BP: 145/88 (09-15-22 @ 08:45) (130/70 - 152/90)  RR: 18 (09-15-22 @ 08:45) (17 - 18)  SpO2: 100% (09-15-22 @ 08:45) (99% - 100%)    I&O's Summary      Physical Exam:  General: NAD  Abdomen: Soft, non-tender, non-distended, fundus firm  Incision: Pfannenstiel incision CDI, subcuticular suture closure  Pelvic: Lochia wnl    Labs:    Blood Type: B Positive  Antibody Screen: Negative  RPR: Negative               7.0    11.04 )-----------( 167      ( 09-14 @ 06:33 )             23.3                6.9    13.45 )-----------( 136      ( 09-13 @ 13:23 )             22.7                7.2    15.57 )-----------( 146      ( 09-13 @ 07:09 )             23.8         MEDICATIONS  (STANDING):  acetaminophen     Tablet .. 975 milliGRAM(s) Oral <User Schedule>  ascorbic acid 500 milliGRAM(s) Oral daily  diphtheria/tetanus/pertussis (acellular) Vaccine (ADAcel) 0.5 milliLiter(s) IntraMuscular once  ferrous    sulfate 325 milliGRAM(s) Oral daily  heparin   Injectable 7500 Unit(s) SubCutaneous every 12 hours  ibuprofen  Tablet. 600 milliGRAM(s) Oral every 6 hours  influenza   Vaccine 0.5 milliLiter(s) IntraMuscular once  lactated ringers. 1000 milliLiter(s) (125 mL/Hr) IV Continuous <Continuous>  levothyroxine 125 MICROGram(s) Oral daily  oxytocin Infusion 333.333 milliUNIT(s)/Min (1000 mL/Hr) IV Continuous <Continuous>  oxytocin Infusion 333.333 milliUNIT(s)/Min (1000 mL/Hr) IV Continuous <Continuous>    MEDICATIONS  (PRN):  diphenhydrAMINE 25 milliGRAM(s) Oral every 6 hours PRN Pruritus  lanolin Ointment 1 Application(s) Topical every 6 hours PRN Sore Nipples  magnesium hydroxide Suspension 30 milliLiter(s) Oral two times a day PRN Constipation  oxyCODONE    IR 5 milliGRAM(s) Oral once PRN Moderate to Severe Pain (4-10)  oxyCODONE    IR 5 milliGRAM(s) Oral every 3 hours PRN Moderate to Severe Pain (4-10)  simethicone 80 milliGRAM(s) Chew every 4 hours PRN Gas  
R1 Progress Note    Patient seen and examined at bedside, no acute overnight events. No acute complaints, pain well controlled. Patient is ambulating and tolerating regular diet. Has passed flatus but not yet had a bowel movement. Urinating independently. Denies dizziness, CP, SOB, N/V, fever.    Vital Signs Last 24 Hours  T(C): 36.7 (09-13-22 @ 05:20), Max: 37 (09-12-22 @ 15:45)  HR: 70 (09-13-22 @ 05:20) (60 - 82)  BP: 126/75 (09-13-22 @ 05:20) (119/58 - 167/58)  RR: 18 (09-13-22 @ 05:20) (18 - 31)  SpO2: 98% (09-13-22 @ 05:20) (97% - 100%)    I&O's Summary    12 Sep 2022 07:01  -  13 Sep 2022 07:00  --------------------------------------------------------  IN: 3000 mL / OUT: 2320 mL / NET: 680 mL        Physical Exam:  General: NAD  Abdomen: Soft, non-tender, non-distended, fundus firm  Incision: Pfannenstiel incision CDI, subcuticular suture closure, staples  Pelvic: Lochia wnl    Labs:    Blood Type: B Positive  Antibody Screen: Negative  RPR: Negative               7.2    15.57 )-----------( 146      ( 09-13 @ 07:09 )             23.8                9.1    8.17  )-----------( 159      ( 09-12 @ 09:20 )             29.7                8.8    9.09  )-----------( 183      ( 08-26 @ 19:24 )             28.4         MEDICATIONS  (STANDING):  acetaminophen     Tablet .. 975 milliGRAM(s) Oral <User Schedule>  diphtheria/tetanus/pertussis (acellular) Vaccine (ADAcel) 0.5 milliLiter(s) IntraMuscular once  heparin   Injectable 7500 Unit(s) SubCutaneous every 12 hours  ibuprofen  Tablet. 600 milliGRAM(s) Oral every 6 hours  influenza   Vaccine 0.5 milliLiter(s) IntraMuscular once  lactated ringers. 1000 milliLiter(s) (125 mL/Hr) IV Continuous <Continuous>  levothyroxine 125 MICROGram(s) Oral daily  morphine PF Spinal 0.1 milliGRAM(s) IntraThecal. once  oxytocin Infusion 333.333 milliUNIT(s)/Min (1000 mL/Hr) IV Continuous <Continuous>  oxytocin Infusion 333.333 milliUNIT(s)/Min (1000 mL/Hr) IV Continuous <Continuous>    MEDICATIONS  (PRN):  dexAMETHasone  Injectable 4 milliGRAM(s) IV Push every 6 hours PRN Nausea  diphenhydrAMINE 25 milliGRAM(s) Oral every 6 hours PRN Pruritus  lanolin Ointment 1 Application(s) Topical every 6 hours PRN Sore Nipples  magnesium hydroxide Suspension 30 milliLiter(s) Oral two times a day PRN Constipation  nalbuphine Injectable 2.5 milliGRAM(s) IV Push every 6 hours PRN Pruritus  naloxone Injectable 0.1 milliGRAM(s) IV Push every 3 minutes PRN For ANY of the following changes in patient status:  A. Breaths Per Minute LESS THAN 10, B. Oxygen saturation LESS THAN 90%, C. Sedation score of 6 for Stop After: 4 Times  ondansetron Injectable 4 milliGRAM(s) IV Push every 6 hours PRN Nausea  oxyCODONE    IR 5 milliGRAM(s) Oral every 3 hours PRN Mild Pain (1 - 3)  oxyCODONE    IR 10 milliGRAM(s) Oral every 3 hours PRN Moderate Pain (4 - 6)  oxyCODONE    IR 5 milliGRAM(s) Oral every 3 hours PRN Moderate to Severe Pain (4-10)  oxyCODONE    IR 5 milliGRAM(s) Oral once PRN Moderate to Severe Pain (4-10)  simethicone 80 milliGRAM(s) Chew every 4 hours PRN Gas

## 2022-09-16 NOTE — PROVIDER CONTACT NOTE (OTHER) - RECOMMENDATIONS
As per MD, continue to monitor VS.
advised patient  to report any  discomforts
advised patient to report any discomforts, will notify MD

## 2022-09-16 NOTE — PROVIDER CONTACT NOTE (OTHER) - ASSESSMENT
Pt asymptomatic. Denies SOB, chest pain, visual disturbances, headache, RUQ pain, N/V.
patient denies headache, dizziness, visual  changes, epigastric  pain
vs stable. pt denies dizziness, lightheadedness, lochia rubra scant
patient  denies headache, dizziness, visual changes, epigastric pain
Patient is upset, just came from NICU, not happy with  status report given to her by NICU staff.  /83, HR 95, O2Sat 100%, temp 36.5. Patient denies headache, blurry vision, nausea, SOB.

## 2022-09-16 NOTE — PROVIDER CONTACT NOTE (OTHER) - BACKGROUND
C/S day 1 with qbl of 420
CS Day 2. BP at 1825 152/90. Repeat in 15mins 142/85. All other VS WNL.
s/p  CS  day  2,s/p masimo for  high BMI
s/p  CS day 1, s/p Masimo for  high  BMI
Patient is multip, C/S day 4, elevated BP

## 2022-09-16 NOTE — PROVIDER CONTACT NOTE (OTHER) - SITUATION
Elevated  SF=579/82
Elevated BP= 141/79
Patient is multip, C/S day 4, elevated BP
hemoglobin 6.9, hematocrit 22.7

## 2022-09-16 NOTE — PROGRESS NOTE ADULT - ATTENDING COMMENTS
PT SITTING UP OOB IN CHAIR. PT UPSET BECAUSE BABY IN NICU WITH WHAT SOUNDS LIKE TTN 2 TO PARENTS  VSS. 2 MILD RANGE BPS  INCISION C/D/I. STAPLES INTACT  H/H=.3 THIS AM  IMP  POD #2 R C/S  ANEMIA 2 TO ACUTE BLOOD LOSS- CONTINUE FE  PT WILL PROBABY STAY UNTIL PPD #4 2 TO  IN NICU  I TOLD PT TO COME INTO OFFICE NW TO HAVE STAPLES REMOVED    DOROTHY SHIN
Patient seen and examined. doing well. tolerating po, ambulating, pain well controlled. +flatus, voiding. denies HA, vision changes, CP, SOB, ruq/epigastric pain.       VSS    abd: soft, NT, minimally distended, no rebound or guarding, fundus firm below umbilicus  incision: staples in place, c/d/i  ext: NT, symmetrical     A/P: POD1 doing well. CBC notable for drop in hg to 7.2 from 9.1 as well as plt of 146. will get repeat CBC now to confirm no further drop. QBL close to 500cc so overall appropriate. exam reassuring. no s/sx of acute blood loss anemia. will start iron supplementation.   - rh positive  - s/p tdap  - MMR/VI  - boy for circ. reviewed risks including but not limited to bleeding, infection, injury to penis, and need for revision. pt consents to circumcision today.
Pt seen on Am rounds  Sh denies CROSS but is very sad and has been crying about her baby in the NICU.    /94 otherwise normal after a mild range BP yesterday  Fundus firm  inc: staples-c/D/I  ext: 1+ edema, no cords  A/P: POD 4 s/p   -If BP is still severe range will need Magnesium and BP meds  -IF BP mild range will start BP meds and monitor for 24 hours  -If BP is normal will d/c home todayt and f.u in 2 days in the office  Rodo Nicole MD

## 2022-10-19 ENCOUNTER — RESULT REVIEW (OUTPATIENT)
Age: 37
End: 2022-10-19

## 2023-03-01 RX ORDER — INSULIN DETEMIR 100/ML (3)
0 INSULIN PEN (ML) SUBCUTANEOUS
Qty: 0 | Refills: 0 | DISCHARGE

## 2023-03-01 RX ORDER — INSULIN DETEMIR 100/ML (3)
16 INSULIN PEN (ML) SUBCUTANEOUS
Qty: 0 | Refills: 0 | DISCHARGE

## 2023-03-17 NOTE — OB RN PREOPERATIVE CHECKLIST - IDENTIFICATION BAND VERIFIED
Arleen representative from [facility], called today regarding patient medication for Livalo 2 MG Tab. Medication is not approve by insurance and are recommending an alternative medicine.      Phone # [phone]  REFERENCE #  [id number]            Thanks   done

## 2023-05-19 NOTE — OB PROVIDER H&P - ATTENDING COMMENTS
Patient sitting in recliner chair, wheels locked, BLE elevated, denies pain/discomfort at this time, able to make needs known, heel protector to LLE, r/t minor discomfort. Heel protector and reposition effective relief at this time. Family at bedside, call light within reach, safety maintained, will continue to monitor.     Problem: Fall Injury Risk  Goal: Absence of Fall and Fall-Related Injury  Outcome: Ongoing, Progressing     Problem: Skin Injury Risk Increased  Goal: Skin Health and Integrity  Outcome: Ongoing, Progressing     Problem: Diabetes Comorbidity  Goal: Blood Glucose Level Within Targeted Range  Outcome: Ongoing, Progressing     Problem: Bleeding (Sepsis/Septic Shock)  Goal: Absence of Bleeding  Outcome: Ongoing, Progressing      Patient seen. Scheduled RCS. Risks reviewed and consents signed including bleeding, infection, damage to surrounding structures, wound infection. Reviewed inc risk of wound infection due to prediabetes and weight. Would like  circumcision. Consents signed and risks reviewed.   Pregnancy complicated by gDMA2 on insulin and prediabetes.     Angelina Heart DO

## 2024-10-28 ENCOUNTER — NON-APPOINTMENT (OUTPATIENT)
Age: 39
End: 2024-10-28

## 2024-11-07 ENCOUNTER — APPOINTMENT (OUTPATIENT)
Dept: ORTHOPEDIC SURGERY | Facility: CLINIC | Age: 39
End: 2024-11-07

## 2025-03-05 NOTE — PRE-ANESTHESIA EVALUATION ADULT - NSATTENDATTESTRD_GEN_ALL_CORE
The patient has been re-examined and I agree with the above assessment or I updated with my findings. Hide Include Location In Plan Question?: No Detail Level: Zone

## 2025-07-16 NOTE — OB PST NOTE - NSANTHTIREDRD_ENT_A_CORE
Abdomen , soft, nontender, nondistended , no guarding or rigidity , no masses palpable , normal bowel sounds , Liver and Spleen , no hepatosplenomegaly , liver nontender , spleen not palpable
No